# Patient Record
(demographics unavailable — no encounter records)

---

## 2025-05-05 NOTE — PHYSICAL EXAM
[Chaperoned Physical Exam] : A chaperone was present in the examining room during all aspects of the physical examination. [MA] : MA [Oriented x3] : oriented x3 [Examination Of The Breasts] : a normal appearance [No Masses] : no breast masses were palpable [Labia Majora] : normal [Labia Minora] : normal [Normal] : normal [External Hemorrhoid] : external hemorrhoid [FreeTextEntry3] :  Thyroid is non-enlarged, nontender. No palpable nodules or goiter. No lymphadenopathy. [FreeTextEntry7] :  Soft. Nondistended. Nontender. No rebound or guarding. There are no palpable masses. [FreeTextEntry1] : External genitalia are within normal limits with no lesions visualized. [FreeTextEntry2] : There are sparse 1 mm bluish purple papules noted at the mid aspect of the right labia majora. [FreeTextEntry4] :  No vaginal discharge, blood or any lesions noted within the vaginal vault. [FreeTextEntry5] :  A speculum was inserted without any difficulty. The cervix was normal in appearance. Pap smear obtained. No cervical motion tenderness. [FreeTextEntry6] : Bimanual examination was notable for a normal, nontender uterus. There were no palpable adnexal masses or adnexal tenderness. [FreeTextEntry9] : Moderate size external hemorrhoid.  No palpable masses.

## 2025-05-05 NOTE — PLAN
[FreeTextEntry1] : Wellness exam. Normal physical examination. Recommendations: Mammography, bilateral breast ultrasound, screening colonoscopy, ophthalmological, dental, and dermatological examinations.   Chest pain.  Patient denies shortness of breath.  Recommend immediate evaluation with primary physician or cardiologist.  Mastodynia.  Benign breast exam.  Information given on conservative management of breast pain.  Recommend mammography with bilateral breast ultrasound.   Discussed proper nutrition and physical exercise. Reviewed age-appropriate vaccinations.

## 2025-05-05 NOTE — HISTORY OF PRESENT ILLNESS
[LMP unknown] : LMP unknown [postmenopausal] : postmenopausal [Y] : Positive pregnancy history [Post-Menopause, No Sxs] : post-menopausal, currently without symptoms [Menopause Age: ____] : age at menopause was [unfilled] [Menarche Age: ____] : age at menarche was [unfilled] [Currently Active] : currently active [TextBox_4] : 57 -year old  2 para 2 postmenopausal presents for an annual examination. Review of systems are notable for breast sensitivity and chest pain.  She denies shortness of breath. [Mammogramdate] : 2024 [BreastSonogramDate] : 2024 [PapSmeardate] : 2024 [ColonoscopyDate] : 2020 [TextBox_43] : Polyp noted [PGHxTotal] : 2 [[facility] Hospital[facility]FullTerm] : 2 [[facility] : 2 [FreeTextEntry1] : 2018